# Patient Record
(demographics unavailable — no encounter records)

---

## 2024-11-22 NOTE — ASSESSMENT
[FreeTextEntry1] : Impression Obstructive Hypertrophic Cardiomyopathy with NYHA class 2-3 symptoms  Plan:  Mal

## 2024-11-22 NOTE — CARDIOLOGY SUMMARY
[de-identified] : 8/21/24 Outside Holter summary: Predominately sinus rhythm with avg HR of 68 bpm (range ). Rare PAC and PVCs noted. No sustained atrial or ventricular arrhythmias.  [de-identified] : 11/6/24 Stress TTE: hyperdynamic LVF with moderate to severe LVH (septum measuring up to 1.6 cm). The left ventricular outflow tract resting gradient is 21 mmHg and 71 mmHg using the Valsalva maneuver. [de-identified] : Outside MRI 9/03/2024: EF 74% Basal septum 17mm, and dynamic LVOT obstruction due to PRECIOUS. LGE was estimated at 2% of total cardiac mass.

## 2024-11-22 NOTE — CARDIOLOGY SUMMARY
[de-identified] : 8/21/24 Outside Holter summary: Predominately sinus rhythm with avg HR of 68 bpm (range ). Rare PAC and PVCs noted. No sustained atrial or ventricular arrhythmias.  [de-identified] : 11/6/24 Stress TTE: hyperdynamic LVF with moderate to severe LVH (septum measuring up to 1.6 cm). The left ventricular outflow tract resting gradient is 21 mmHg and 71 mmHg using the Valsalva maneuver. [de-identified] : Outside MRI 9/03/2024: EF 74% Basal septum 17mm, and dynamic LVOT obstruction due to PRECIOUS. LGE was estimated at 2% of total cardiac mass.

## 2024-11-22 NOTE — CARDIOLOGY SUMMARY
[de-identified] : 8/21/24 Outside Holter summary: Predominately sinus rhythm with avg HR of 68 bpm (range ). Rare PAC and PVCs noted. No sustained atrial or ventricular arrhythmias.  [de-identified] : 11/6/24 Stress TTE: hyperdynamic LVF with moderate to severe LVH (septum measuring up to 1.6 cm). The left ventricular outflow tract resting gradient is 21 mmHg and 71 mmHg using the Valsalva maneuver. [de-identified] : Outside MRI 9/03/2024: EF 74% Basal septum 17mm, and dynamic LVOT obstruction due to PRECIOUS. LGE was estimated at 2% of total cardiac mass.

## 2024-11-22 NOTE — REASON FOR VISIT
[FreeTextEntry1] : Mr. Tripathi is presenting for follow up visit, last seen 9/27/24.   He is a 45-year-old man with a history of obstructive hypertrophic cardiomyopathy and hyperlipidemia.   When last seen, NYHA class 2-3 symptoms were present. He was ordered for a stress TTE to evaluate for PRECIOUS and LVOT gradient. This was performed on 11/6/24 and revealed a hyperdynamic LVF with moderate to severe LVH (septum measuring up to 1.6 cm). The left ventricular outflow tract resting gradient is 21 mmHg and 71 mmHg using the Valsalva maneuver (actually, after 20 jumping jacks).   Sister notices symptoms when bending down, lifting things. Gets dizzy and dyspnea. Can't walk up hills.